# Patient Record
Sex: MALE | Race: BLACK OR AFRICAN AMERICAN
[De-identification: names, ages, dates, MRNs, and addresses within clinical notes are randomized per-mention and may not be internally consistent; named-entity substitution may affect disease eponyms.]

---

## 2017-03-13 ENCOUNTER — HOSPITAL ENCOUNTER (OUTPATIENT)
Dept: HOSPITAL 47 - LABWHC1 | Age: 22
Discharge: HOME | End: 2017-03-13
Payer: COMMERCIAL

## 2017-03-13 DIAGNOSIS — Z13.0: Primary | ICD-10-CM

## 2017-03-13 PROCEDURE — 85613 RUSSELL VIPER VENOM DILUTED: CPT

## 2017-03-13 PROCEDURE — 85730 THROMBOPLASTIN TIME PARTIAL: CPT

## 2017-03-13 PROCEDURE — 36415 COLL VENOUS BLD VENIPUNCTURE: CPT

## 2017-06-04 ENCOUNTER — HOSPITAL ENCOUNTER (EMERGENCY)
Dept: HOSPITAL 47 - EC | Age: 22
Discharge: HOME | End: 2017-06-04
Payer: COMMERCIAL

## 2017-06-04 VITALS — TEMPERATURE: 99.6 F | DIASTOLIC BLOOD PRESSURE: 78 MMHG | SYSTOLIC BLOOD PRESSURE: 137 MMHG | RESPIRATION RATE: 18 BRPM

## 2017-06-04 VITALS — HEART RATE: 88 BPM

## 2017-06-04 DIAGNOSIS — J06.9: Primary | ICD-10-CM

## 2017-06-04 DIAGNOSIS — Z79.51: ICD-10-CM

## 2017-06-04 DIAGNOSIS — Z91.09: ICD-10-CM

## 2017-06-04 DIAGNOSIS — Z79.899: ICD-10-CM

## 2017-06-04 PROCEDURE — 99284 EMERGENCY DEPT VISIT MOD MDM: CPT

## 2017-06-04 PROCEDURE — 94640 AIRWAY INHALATION TREATMENT: CPT

## 2017-06-04 PROCEDURE — 71020: CPT

## 2017-06-04 NOTE — ED
Abdominal Pain HPI





- General


Chief Complaint: Abdominal Pain


Stated Complaint: abdominal pain


Time Seen by Provider: 06/04/17 19:41


Source: patient, RN notes reviewed


Mode of arrival: ambulatory


Limitations: no limitations





- History of Present Illness


Initial Comments: 





20 yo male presents to the ER with a cc of right sided chest pain.  Patient has 

had a cough cold runny nose for the past 2-3 weeks.  Now he is now some right-

sided chest pain.  He states it hurts that he takes a deep breath.  Patient 

states he continues to have the cough.  Patient denies any high fevers at home.

  Patient does admit to a runny nose.  Basically were concerned due to the 

continued symptoms without that they should be evaluated.Patient denies any 

recent back pain, abdominal pain, nausea vomiting, numbness or tingling, 

dysuria or hematuria, constipation or diarrhea, headaches or visual changes, or 

any other current symptoms.





- Related Data


 Home Medications











 Medication  Instructions  Recorded  Confirmed


 


Fluticasone Nasal Spray [Flonase 2 spray EA NOSTRIL MOTUWETHFR 08/12/16 06/04/17





Nasal Spray]   


 


Cetirizine HCl [Zyrtec] 10 mg PO BID 06/04/17 06/04/17








 Previous Rx's











 Medication  Instructions  Recorded


 


Albuterol Inhaler [Ventolin Hfa 1 - 2 puff INHALATION Q4-6H PRN #1 06/04/17





Inhaler] inhaler 


 


Azithromycin [Zithromax] 250 mg PO AS DIRECTED #6 tab 06/04/17


 


predniSONE 50 mg PO DAILY #5 tab 06/04/17











 Allergies











Allergy/AdvReac Type Severity Reaction Status Date / Time


 


grass pollen-perennial rye, Allergy  Swelling Verified 06/04/17 20:03





standar     


 


house dust mite Allergy  Swelling Verified 06/04/17 20:03


 


tree and shrub pollen Allergy  Swelling Verified 06/04/17 20:03














Review of Systems


ROS Statement: 


Those systems with pertinent positive or pertinent negative responses have been 

documented in the HPI.





ROS Other: All systems not noted in ROS Statement are negative.





Past Medical History


Additional Past Medical History / Comment(s): irregular heartbeat


History of Any Multi-Drug Resistant Organisms: None Reported


Past Surgical History: No Surgical Hx Reported


Past Psychological History: Anxiety


Smoking Status: Never smoker


Past Alcohol Use History: None Reported


Past Drug Use History: None Reported





General Exam





- General Exam Comments


Initial Comments: 





General exam: Alert, active, comfortable in no apparent distress


Head: Normocephalic 


Eyes: Normal reaction of pupils, equal size, normal range of extraocular motion


Ears: normal external ear canals, pink tympanic membranes with normal cone of 

light


Nose: clear with pink turbinates


Throat: no erythema or exudates with normal sized tonsils


Neck: no masses, no nuchal rigidity


Chest: no chest wall deformity


Lungs: equal air entry with no crackles or wheeze


CVS: S1 and S2 normal with no audible mumurs, regular rhythm


Spine: no scoliosis or deformity


Skin: no rashes


Neurological: No focal deficits, tone is normal in all 4 extremities


Limitations: no limitations





Course


 Vital Signs











  06/04/17 06/04/17





  19:16 20:01


 


Temperature 99.6 F 


 


Pulse Rate 92 84


 


Respiratory 18 





Rate  


 


Blood Pressure 137/78 


 


O2 Sat by Pulse 98 





Oximetry  














Medical Decision Making





- Medical Decision Making





21-year-old male presents emergency room chief complaint of cough with right-

sided chest pain. Patient's chest x-ray is negative for pneumonia.  Discussed 

patient with significant respiratory infection with a cough cold runny nose 

like symptoms. we will start patient On steroids as well as albuterol inhaler.  

Discussed return parameters and follow-up and all the patient's family's 

questions.  He stated he understood and management the plan.  They will be 

discharged.





- Radiology Data


Radiology results: report reviewed, image reviewed





Disposition


Clinical Impression: 


 Upper respiratory disease





Disposition: HOME SELF-CARE


Condition: Stable


Instructions:  Upper Respiratory Infection (ED)


Additional Instructions: 


Please use medication as discussed. Please follow up with family doctor if 

symptoms have not improved over the next two days. Please return to the 

emergency room if your symptoms increase or worsen or for any other concerns. 


Prescriptions: 


Albuterol Inhaler [Ventolin Hfa Inhaler] 1 - 2 puff INHALATION Q4-6H PRN #1 

inhaler


 PRN Reason: Cough


Azithromycin [Zithromax] 250 mg PO AS DIRECTED #6 tab


predniSONE 50 mg PO DAILY #5 tab


Referrals: 


Allie Tuttle DO [Primary Care Provider] - 1-2 days


Time of Disposition: 20:05

## 2017-06-04 NOTE — XR
EXAMINATION TYPE: XR chest 2V

 

DATE OF EXAM: 6/4/2017 7:55 PM

 

COMPARISON: 4/10/2016

 

HISTORY: Cough

 

TECHNIQUE:  Frontal and lateral views of the chest are obtained.

 

FINDINGS:  Heart and mediastinum are normal. Lungs are clear. Diaphragm is normal. Bony thorax is int
act.

 

IMPRESSION:  Normal chest

## 2021-03-04 ENCOUNTER — HOSPITAL ENCOUNTER (EMERGENCY)
Dept: HOSPITAL 47 - EC | Age: 26
Discharge: HOME | End: 2021-03-04
Payer: COMMERCIAL

## 2021-03-04 VITALS
DIASTOLIC BLOOD PRESSURE: 86 MMHG | HEART RATE: 64 BPM | RESPIRATION RATE: 16 BRPM | TEMPERATURE: 98.1 F | SYSTOLIC BLOOD PRESSURE: 142 MMHG
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
## 2021-03-04 DIAGNOSIS — Z79.51: ICD-10-CM

## 2021-03-04 DIAGNOSIS — S16.1XXA: Primary | ICD-10-CM

## 2021-03-04 DIAGNOSIS — Z91.048: ICD-10-CM

## 2021-03-04 PROCEDURE — 96374 THER/PROPH/DIAG INJ IV PUSH: CPT

## 2021-03-04 PROCEDURE — 99283 EMERGENCY DEPT VISIT LOW MDM: CPT

## 2021-03-04 NOTE — ED
Neck Injury/Pain HPI





- General


Chief Complaint: Neck Pain/Injury


Stated Complaint: Neck pain


Time Seen by Provider: 03/04/21 11:03


Mode of arrival: ambulatory


Limitations: no limitations





- History of Present Illness


Initial Comments: 





Patient is a 25-year-old male presenting to the emergency Department with 

complaints of right-sided neck pain that happened when he woke up this morning. 

Patient states he woke up rolled over and felt pain in the right side of his 

neck.  He denies any previous injuries or surgeries to his neck.  He states his 

has happened in the past.  He did not take any Tylenol or Motrin.  He states he 

called his mom and came right to the ER.  He denies any fever or chills, no 

numbness and tingling into his upper extremities.  Denies any injuries or trau

ma.  He has no further complaints.





- Related Data


                                Home Medications











 Medication  Instructions  Recorded  Confirmed


 


Fluticasone Nasal Spray [Flonase 2 spray EA NOSTRIL MOTUWETHFR 08/12/16 06/04/17





Nasal Spray]   


 


Cetirizine HCl [Zyrtec] 10 mg PO BID 06/04/17 06/04/17








                                  Previous Rx's











 Medication  Instructions  Recorded


 


Albuterol Inhaler (Mhu) [Ventolin 1 - 2 puff INHALATION Q4-6H PRN #1 06/04/17





Hfa Inhaler (Mhu)] inhaler 


 


Azithromycin [Zithromax] 250 mg PO AS DIRECTED #6 tab 06/04/17


 


predniSONE 50 mg PO DAILY #5 tab 06/04/17


 


Cyclobenzaprine [Flexeril] 5 mg PO BID #10 tablet 03/04/21











                                    Allergies











Allergy/AdvReac Type Severity Reaction Status Date / Time


 


grass pollen-perennial rye, Allergy  Swelling Verified 03/04/21 10:57





standar     


 


house dust mite Allergy  Swelling Verified 03/04/21 10:57


 


tree and shrub pollen Allergy  Swelling Verified 03/04/21 10:57














Review of Systems


ROS Statement: 


Those systems with pertinent positive or pertinent negative responses have been 

documented in the HPI.





ROS Other: All systems not noted in ROS Statement are negative.





Past Medical History


Additional Past Medical History / Comment(s): irregular heartbeat


History of Any Multi-Drug Resistant Organisms: None Reported


Past Surgical History: No Surgical Hx Reported


Past Psychological History: Anxiety


Smoking Status: Never smoker


Past Alcohol Use History: Occasional


Past Drug Use History: None Reported





General Exam





- General Exam Comments


Initial Comments: 





GENERAL: 


Patient is well-developed and well-nourished.  Patient is nontoxic and in no 

acute distress.





HEAD: 


Atraumatic, normocephalic.





EYES:


Pupils equal round and reactive to light, extraocular movements intact, sclera 

anicteric, conjunctiva are normal.  Eyelids were unremarkable.





ENT: 


TMs normal, nares patent, oropharynx clear without exudates.  Moist mucous 

membranes.





NECK: 


Patient has decreased neck range of motion secondary of tightness.  He does have

 some muscle spasms present on the right side of the neck, cervical paraspinals,

 upper trapezius muscles.  He has no midline tenderness., supple without 

lymphadenopathy or JVD.





LUNGS:


Unlabored respirations.  Breath sounds clear to auscultation bilaterally and 

equal.  No wheezes rales or rhonchi.





HEART:


Regular rate and rhythm without murmurs, rubs or gallops.





ABDOMEN: 


Soft, nontender, normoactive bowel sounds.  No guarding, no rebound.  No masses 

appreciated.





: Deferred 





MUSCULOSKELETAL: 


Normal extremities with adequate strength and normal range of motion, no pitting

 or edema.  No clubbing or cyanosis.





NEUROLOGICAL: 


Patient is alert and oriented x 3.  Motor and sensory are also intact.  Cranial 

nerves II through XII grossly intact.  Symmetrical smile.  Normal speech, normal

 gait.   





PSYCH:


Normal mood, normal affect.





SKIN:


 Warm, Dry, normal turgor, no rashes or lesions noted.


Limitations: no limitations





Course





                                   Vital Signs











  03/04/21





  10:52


 


Temperature 98.1 F


 


Pulse Rate 64


 


Respiratory 16





Rate 


 


Blood Pressure 142/86


 


O2 Sat by Pulse 99





Oximetry 














Medical Decision Making





- Medical Decision Making





Patient is a 25-year-old male here for muscle spasms of the right-sided neck 

that happen when he woke up this morning.  His exam is consistent with these 

muscle spasms, no alarming symptoms, no acute neuro deficits, no midline 

tenderness of the cervical spine.  I discussed the patient he is to use heat at 

home, will give him some muscle relaxers try at night time, gentle stretching.  

I'll also give him Toradol today.  He can follow-up with his family doctor 

symptoms persist.  Patient is stable for discharge.  Patient is in agreement 

with this plan of care.  Return parameters were discussed with the patient and 

they verbalized understanding.  Case discussed with Dr. Rich. 





Disposition


Clinical Impression: 


 Strain of neck muscle, Muscle spasms of neck





Disposition: HOME SELF-CARE


Condition: Stable


Instructions (If sedation given, give patient instructions):  Cervical Strain 

(ED)


Additional Instructions: 


Please return to the Emergency Department if symptoms worsen or any other 

concerns.


Recommend heat to the area, very gentle stretching.


Continue with Tylenol and/or Motrin for any discomfort.


May use muscle relaxers at night.


Prescriptions: 


Cyclobenzaprine [Flexeril] 5 mg PO BID #10 tablet


Is patient prescribed a controlled substance at d/c from ED?: No


Referrals: 


Allie Tuttle DO [Primary Care Provider] - 1-2 days

## 2024-08-07 ENCOUNTER — HOSPITAL ENCOUNTER (EMERGENCY)
Dept: HOSPITAL 47 - EC | Age: 29
LOS: 1 days | Discharge: HOME | End: 2024-08-08
Payer: COMMERCIAL

## 2024-08-07 DIAGNOSIS — R55: Primary | ICD-10-CM

## 2024-08-07 PROCEDURE — 99284 EMERGENCY DEPT VISIT MOD MDM: CPT

## 2024-08-07 PROCEDURE — 84484 ASSAY OF TROPONIN QUANT: CPT

## 2024-08-07 PROCEDURE — 93005 ELECTROCARDIOGRAM TRACING: CPT

## 2024-08-07 PROCEDURE — 85025 COMPLETE CBC W/AUTO DIFF WBC: CPT

## 2024-08-07 PROCEDURE — 80053 COMPREHEN METABOLIC PANEL: CPT

## 2024-08-07 PROCEDURE — 71046 X-RAY EXAM CHEST 2 VIEWS: CPT

## 2024-09-16 NOTE — XR
Patient: Hamlet Sorensen J Ordering Physician: Unknown, Unknown ID: [id number] Phone, Pager: Phone: 
N/A Pager: N/A : 1995 Age/Gender: 28Y, M Primary Location: N/A Procedure: CXR 2VIEW Study Darek
e: 2024 12:20:00 AM Accession #: [id number]

 

EXAMINATION TYPE: XR chest 2V

 

DATE OF EXAM: 2024 1:21 PM

 

CLINICAL INDICATION: Chest pain left arm numbness

 

COMPARISON: Chest radiographs from 2017

 

TECHNIQUE: XR chest 2V Frontal view of the chest.

 

FINDINGS: 

Lungs/Pleura: There is no evidence of pleural effusion, focal consolidation, or pneumothorax.  

Pulmonary vascularity: Unremarkable.

Heart/mediastinum: Cardiomediastinal silhouette is unremarkable.

Musculoskeletal: No acute osseous pathology.

 

Other findings: None

 

IMPRESSION: 

No acute cardiopulmonary disease/process.

## 2025-03-19 ENCOUNTER — HOSPITAL ENCOUNTER (OUTPATIENT)
Dept: HOSPITAL 47 - RADCTMAIN | Age: 30
Discharge: HOME | End: 2025-03-19
Attending: FAMILY MEDICINE
Payer: COMMERCIAL

## 2025-03-19 DIAGNOSIS — R59.0: Primary | ICD-10-CM

## 2025-03-19 PROCEDURE — 70491 CT SOFT TISSUE NECK W/DYE: CPT

## 2025-03-19 NOTE — CT
EXAMINATION TYPE: CT soft tissue neck w con

 

DATE OF EXAM: 3/19/2025 4:03 PM

 

COMPARISON: 4/27/2016.  

 

CLINICAL INDICATION: Male, 29 years old with history of R59.0 LOCALIZED ENLARGED LYMPH NODES; PHH, Ma
ss on left side of neck that was noticed 10 years ago.

 

TECHNIQUE: Standard enhanced CT of the neck.  Axial sections with coronal and sagittal reformats were
 obtained. 

Contrast used:100ml mL of Isovue 300 with IV Contrast, (None if empty)

Oral contrast used: (None if empty)

CT DLP: 457.9 mGycm, Automated exposure control for dose reduction was used.

 

FINDINGS: 

Brain: Visualized portions are grossly unremarkable.

Orbits: Unremarkable 

Sinuses: Grossly unremarkable.

Spaces of the neck: Clear and symmetric. Palpable marker in the left neck correlates with symmetric a
ppearing soft tissues. There is a prominent nonenlarged jugulodigastric lymph node deep to sternoclei
domastoid muscle measuring 9 mm in thickness which is within normal limits. No other suspicious mass 
fluid collection identified.

 

Musculoskeletal: No acute osseous pathology. 

Lymph nodes:  Multiple nonenlarged lymph nodes are seen along both anterior chains of the neck.  

Vascular structures: Visualized major arteries are patent without evidence of aneurysm.Slitlike appea
keshia of the bilateral internal jugular veins series 3 image 66 where they are compressed against the
 first vertebral body and styloid process. This is resulting in additional dilated other venous vascu
lar structures into the base of the neck around the C1 vertebrae most pronounced on the right

Thoracic Inlet/airway: Airway is patent. The lung apices are clear.

Soft tissues/Thyroid: Thyroid and remainder of the soft tissues are unremarkable.

Other: none.

 

IMPRESSION:

1.  Left palpable marker is near the jugulodigastric lymph node which is within normal limits. No cesario
picious mass or lymphadenopathy identified. No significant change from 2016.

2.  Slitlike appearance of the bilateral internal jugular veins series 3 image 66 where they are comp
ressed against the first vertebral body and styloid process. Correlate for [place] syndrome. Findings s
imilar to prior in 2016.

 

[facility], Workstation: [id number], 3/19/2025 11:23 PM